# Patient Record
Sex: FEMALE | URBAN - METROPOLITAN AREA
[De-identification: names, ages, dates, MRNs, and addresses within clinical notes are randomized per-mention and may not be internally consistent; named-entity substitution may affect disease eponyms.]

---

## 2019-08-01 ENCOUNTER — HOSPITAL ENCOUNTER (EMERGENCY)
Facility: MEDICAL CENTER | Age: 29
End: 2019-08-01
Attending: EMERGENCY MEDICINE
Payer: MEDICAID

## 2019-08-01 VITALS
WEIGHT: 133.82 LBS | OXYGEN SATURATION: 98 % | BODY MASS INDEX: 22.85 KG/M2 | SYSTOLIC BLOOD PRESSURE: 106 MMHG | TEMPERATURE: 97.5 F | RESPIRATION RATE: 14 BRPM | HEIGHT: 64 IN | DIASTOLIC BLOOD PRESSURE: 74 MMHG | HEART RATE: 57 BPM

## 2019-08-01 DIAGNOSIS — J02.9 PHARYNGITIS, UNSPECIFIED ETIOLOGY: ICD-10-CM

## 2019-08-01 LAB
S PYO AG THROAT QL: NORMAL
SIGNIFICANT IND 70042: NORMAL
SITE SITE: NORMAL
SOURCE SOURCE: NORMAL

## 2019-08-01 PROCEDURE — 87880 STREP A ASSAY W/OPTIC: CPT

## 2019-08-01 PROCEDURE — 87081 CULTURE SCREEN ONLY: CPT

## 2019-08-01 PROCEDURE — 700111 HCHG RX REV CODE 636 W/ 250 OVERRIDE (IP): Performed by: EMERGENCY MEDICINE

## 2019-08-01 PROCEDURE — 99284 EMERGENCY DEPT VISIT MOD MDM: CPT

## 2019-08-01 RX ORDER — PREDNISONE 10 MG/1
20 TABLET ORAL ONCE
Status: COMPLETED | OUTPATIENT
Start: 2019-08-01 | End: 2019-08-01

## 2019-08-01 RX ORDER — AMOXICILLIN 500 MG/1
1000 CAPSULE ORAL DAILY
Qty: 20 CAP | Refills: 0 | Status: SHIPPED | OUTPATIENT
Start: 2019-08-01 | End: 2019-08-11

## 2019-08-01 RX ADMIN — PREDNISONE 20 MG: 10 TABLET ORAL at 17:20

## 2019-08-01 SDOH — HEALTH STABILITY: MENTAL HEALTH: HOW OFTEN DO YOU HAVE A DRINK CONTAINING ALCOHOL?: NEVER

## 2019-08-01 NOTE — ED PROVIDER NOTES
"ED Provider Note    Chief Complaint:   Sore throat    HPI:  Elvin Lewis is a 28 y.o. female who presents with chief complaint of sore throat.  Symptoms began 2 to 3 days ago, she describes pain and a sensation of swelling localized to the posterior pharynx.  She has had some associated cough, denies shortness of breath, denies fevers, denies chest pain.  She has had some mild associated nasal congestion.  She is tried over-the-counter medications, however sore throat has persistently worsened.    She denies neck pain, denies difficulty swallowing, no drooling.  She is no history of impaired immunity, no significant past medical history.    Review of Systems:  See HPI for pertinent positives and negatives.     Past Medical History:       Social History:  Social History     Tobacco Use   • Smoking status: Never Smoker   • Smokeless tobacco: Never Used   Substance and Sexual Activity   • Alcohol use: Not Currently     Frequency: Never   • Drug use: Not Currently   • Sexual activity: Not on file       Surgical History:  patient denies any surgical history    Current Medications:  Home Medications    **Home medications have not yet been reviewed for this encounter**         Allergies:  No Known Allergies    Physical Exam:  Vital Signs: /74   Pulse (!) 57   Temp 36.4 °C (97.5 °F) (Temporal)   Resp 14   Ht 1.626 m (5' 4\")   Wt 60.7 kg (133 lb 13.1 oz)   LMP 07/22/2019 (Approximate)   SpO2 98%   BMI 22.97 kg/m²   Constitutional: Alert, no acute distress  HENT: Moist mucus membranes, mildly inflamed posterior pharynx with patchy exudates present, no evidence of abscess, no evidence of airway compromise  Eyes: Normal conjunctiva  Neck: Supple, normal range of motion, no lymphadenopathy, no stridor  Cardiovascular: Extremities are warm and well perfused, no murmur appreciated, normal cardiac auscultation  Pulmonary: No respiratory distress, normal work of breathing, no accessory muscule usage, breath sounds " clear and equal bilaterally  Psychiatric: Normal and appropriate mood and affect    Medical records reviewed for continuity of care.  No recent visits for similar symptoms.    Labs:  Labs Reviewed   RAPID STREP,CULT IF INDICATED   BETA STREP SCREEN (GP. A)     ED Medications Administered:  Medications   predniSONE (DELTASONE) tablet 20 mg (20 mg Oral Given 8/1/19 1720)     MDM:  Patient presents with sore throat for 3 days.  Physical exam is concerning for strep pharyngitis.  She has no evidence of abscess, no evidence of airway compromise.  She is afebrile, vital signs are reassuring with no tachycardia and no hypotension.  Based on physical exam, will treat empirically for strep pharyngitis.  Rapid strep is negative, culture pending at this time.  Patient is counseled to follow-up with her primary care physician for recheck within 1 week and to receive final culture results. Return precautions were discussed with the patient, and provided in written form with the patient's discharge instructions.     Disposition:  Discharge home in stable condition    Final Impression:  1. Pharyngitis, unspecified etiology        Current Outpatient Medications   Medication Sig Dispense Refill   • amoxicillin (AMOXIL) 500 MG Cap Take 2 Caps by mouth every day for 10 days. 20 Cap 0       Electronically signed by: Izzy Larson, 8/1/2019 6:31 PM

## 2019-08-01 NOTE — ED TRIAGE NOTES
"Chief Complaint   Patient presents with   • Cold Symptoms   • Congestion   • Sore Throat     /78   Pulse 75   Temp 36.7 °C (98 °F) (Temporal)   Resp 14   Ht 1.626 m (5' 4\")   Wt 60.7 kg (133 lb 13.1 oz)   SpO2 96%   Pt informed of wait times. Educated on triage process.  Asked to return to triage RN for any new or worsening of symptoms. Thanked for patience.        "

## 2019-08-02 NOTE — ED NOTES
Discharged to home with instructions and prescription  Patient to follow up with her doctor and/or return for worsening symptoms

## 2019-08-02 NOTE — DISCHARGE INSTRUCTIONS
Please follow-up with your primary care physician for complete recheck within 1 week.  You may contact the community clinic listed above if you do not have a primary care physician at this time.  Return to the emergency department if you develop any new or worsening symptoms including worsening pain, fevers, swelling, difficulty swallowing, drooling, or any further concerns.  Additionally, please return in 1 week if your symptoms have not resolved and you are not able to follow-up with primary care.

## 2019-08-03 LAB
S PYO SPEC QL CULT: NORMAL
SIGNIFICANT IND 70042: NORMAL
SITE SITE: NORMAL
SOURCE SOURCE: NORMAL